# Patient Record
Sex: OTHER/UNKNOWN | Race: WHITE | NOT HISPANIC OR LATINO | URBAN - METROPOLITAN AREA
[De-identification: names, ages, dates, MRNs, and addresses within clinical notes are randomized per-mention and may not be internally consistent; named-entity substitution may affect disease eponyms.]

---

## 2018-07-25 ENCOUNTER — OFFICE VISIT - RIVER FALLS (OUTPATIENT)
Dept: FAMILY MEDICINE | Facility: CLINIC | Age: 28
End: 2018-07-25

## 2018-07-25 ASSESSMENT — MIFFLIN-ST. JEOR: SCORE: 1894.98

## 2022-02-11 VITALS
WEIGHT: 200.6 LBS | SYSTOLIC BLOOD PRESSURE: 110 MMHG | HEIGHT: 72 IN | HEART RATE: 64 BPM | BODY MASS INDEX: 27.17 KG/M2 | DIASTOLIC BLOOD PRESSURE: 68 MMHG | TEMPERATURE: 98 F

## 2022-02-16 NOTE — PROGRESS NOTES
Patient:   JEZ SADLER            MRN: [id number]            FIN: [id number]               Age:   28 years     Sex:  Unknown     :  1990   Associated Diagnoses:   Well adult; Recurrent herpes labialis   Author:   Arya Russo MD      Visit Information      Date of Service: 2018 12:39 pm  Performing Location: [facility]  Encounter#: [id number]      Primary Care Provider (PCP):  NONE ,    Visit type:  Annual exam.    Accompanied by:  No one.    Source of history:  Self, Medical record.    History limitation:  None.       Chief Complaint   2018 12:44 PM CDT   annual px.  also check sore on lower lip since yesterday.   has been getting sores on/off for several yrs.      Well Adult History   Well Adult History             The patient presents for well adult exam.  The patient's general health status is described as good.  The patient's diet is described as balanced.  Exercise: occasional.  Associated symptoms consist of none.  Medical encounters: none.  Additional pertinent history: daily caffeine use, tobacco use none and occasional alcohol use.       colonoscopy:  n/a  lipid and diabetes screening:  n/a  prostate cancer screening:  n/a  heart disease risk:  low  immunizations:  up to date   other:  recurrent vesicular lesion on upper lip         Review of Systems   Constitutional:  No fever, No chills, No sweats, No weakness, No fatigue.    Eye:  No recent visual problem.    Ear/Nose/Mouth/Throat:  No decreased hearing, No nasal congestion, No sore throat.    Respiratory:  No shortness of breath, No cough.    Cardiovascular:  Negative, No chest pain, No palpitations, No peripheral edema.    Gastrointestinal:  No nausea, No vomiting, No diarrhea, No constipation, No heartburn.    Genitourinary:  No dysuria, No change in urine stream.    Hematology/Lymphatics:  No bruising tendency, No bleeding tendency.    Endocrine:  No cold intolerance, No heat intolerance.    Immunologic:   Negative.    Musculoskeletal:  No back pain, No neck pain, No joint pain, No muscle pain.    Integumentary:  Skin lesion, No rash, No dryness.    Neurologic:  Alert and oriented X4, No headache.    Psychiatric:  No anxiety, No depression.      PHQ9 and CAGE reviewed and discussed with patient.       Health Status   Allergies:    Allergic Reactions (Selected)  No Known Medication Allergies   Medications:  (Selected)      Problem list:    No problem items selected or recorded.      Histories   Past Medical History:    No active or resolved past medical history items have been selected or recorded.   Family History:    No family history items have been selected or recorded.   Procedure history:    No active procedure history items have been selected or recorded.   Social History:        Tobacco Assessment            Never (less than 100 in lifetime)        Physical Examination   Vital Signs   7/25/2018 12:44 PM CDT Temperature Tympanic 98 DegF    Peripheral Pulse Rate 64 bpm    Pulse Site Radial artery    HR Method Manual    Systolic Blood Pressure 110 mmHg    Diastolic Blood Pressure 68 mmHg    Mean Arterial Pressure 82 mmHg    BP Site Left arm    BP Method Manual      Measurements from flowsheet : Measurements   7/25/2018 12:44 PM CDT Height Measured - Standard 71.5 in    Weight Measured - Standard 200.6 lb    BSA 2.14 m2    Body Mass Index 27.59 kg/m2  HI      General:  Alert and oriented, No acute distress.    Eye:  Pupils are equal, round and reactive to light, Extraocular movements are intact, Normal conjunctiva.    HENT:  Normocephalic, Tympanic membranes are clear, Oral mucosa is moist, No pharyngeal erythema, No sinus tenderness.         Mouth: Lips ( Upper lip, vesicular lesion with edema ).    Neck:  Supple, Non-tender, No carotid bruit, No lymphadenopathy, No thyromegaly.    Respiratory:  Lungs are clear to auscultation, Respirations are non-labored, Breath sounds are equal, No chest wall tenderness.     Cardiovascular:  Normal rate, Regular rhythm, No murmur, No gallop, Good pulses equal in all extremities, Normal peripheral perfusion, No edema.    Gastrointestinal:  Soft, Non-tender, Non-distended, Normal bowel sounds, No organomegaly.    Genitourinary:  No costovertebral angle tenderness.    Lymphatics:  WNL.    Musculoskeletal:  Normal range of motion, Normal strength, No tenderness, No swelling, No deformity.    Integumentary:  Warm, Dry, No rash.    Neurologic:  Alert, Oriented, Normal sensory, Normal motor function, No focal deficits.    Psychiatric:  Cooperative, Appropriate mood & affect.       Impression and Plan   Diagnosis     Well adult ([id number] Z00.00).     Recurrent herpes labialis ([id number] B00.1).     Course:  Progressing as expected.    Plan:  Discussed health maintenance, diet, exercise, BMI discussed, valacyclovir at onset of future lip lesions, discussed HSV 1.         Follow-up: As needed or sooner if symptoms worsen.